# Patient Record
Sex: FEMALE | Race: WHITE | NOT HISPANIC OR LATINO | Employment: OTHER | URBAN - METROPOLITAN AREA
[De-identification: names, ages, dates, MRNs, and addresses within clinical notes are randomized per-mention and may not be internally consistent; named-entity substitution may affect disease eponyms.]

---

## 2020-02-03 ENCOUNTER — OFFICE VISIT (OUTPATIENT)
Dept: FAMILY MEDICINE CLINIC | Facility: CLINIC | Age: 85
End: 2020-02-03
Payer: MEDICARE

## 2020-02-03 VITALS
SYSTOLIC BLOOD PRESSURE: 134 MMHG | HEIGHT: 59 IN | BODY MASS INDEX: 28.22 KG/M2 | DIASTOLIC BLOOD PRESSURE: 64 MMHG | TEMPERATURE: 99.7 F | WEIGHT: 140 LBS | RESPIRATION RATE: 16 BRPM | OXYGEN SATURATION: 96 % | HEART RATE: 113 BPM

## 2020-02-03 DIAGNOSIS — E78.5 DYSLIPIDEMIA: ICD-10-CM

## 2020-02-03 DIAGNOSIS — R09.81 CONGESTION OF NASAL SINUS: ICD-10-CM

## 2020-02-03 DIAGNOSIS — M67.40 GANGLION CYST: ICD-10-CM

## 2020-02-03 DIAGNOSIS — R53.83 FATIGUE, UNSPECIFIED TYPE: ICD-10-CM

## 2020-02-03 DIAGNOSIS — M25.531 RIGHT WRIST PAIN: Primary | ICD-10-CM

## 2020-02-03 PROBLEM — I25.10 CORONARY ARTERY DISEASE: Status: ACTIVE | Noted: 2020-02-03

## 2020-02-03 PROBLEM — J45.20 MILD INTERMITTENT ASTHMA WITHOUT COMPLICATION: Status: ACTIVE | Noted: 2020-02-03

## 2020-02-03 PROCEDURE — 3008F BODY MASS INDEX DOCD: CPT | Performed by: INTERNAL MEDICINE

## 2020-02-03 PROCEDURE — 1160F RVW MEDS BY RX/DR IN RCRD: CPT | Performed by: INTERNAL MEDICINE

## 2020-02-03 PROCEDURE — 1036F TOBACCO NON-USER: CPT | Performed by: INTERNAL MEDICINE

## 2020-02-03 PROCEDURE — 4040F PNEUMOC VAC/ADMIN/RCVD: CPT | Performed by: INTERNAL MEDICINE

## 2020-02-03 PROCEDURE — 99203 OFFICE O/P NEW LOW 30 MIN: CPT | Performed by: INTERNAL MEDICINE

## 2020-02-03 RX ORDER — CHOLECALCIFEROL (VITAMIN D3) 125 MCG
2500 CAPSULE ORAL DAILY
COMMUNITY

## 2020-02-03 RX ORDER — CHOLECALCIFEROL (VITAMIN D3) 125 MCG
500 CAPSULE ORAL DAILY
COMMUNITY

## 2020-02-03 RX ORDER — CETIRIZINE HYDROCHLORIDE 10 MG/1
10 TABLET, CHEWABLE ORAL DAILY
Start: 2020-02-03

## 2020-02-03 RX ORDER — MELOXICAM 15 MG/1
7.5 TABLET ORAL DAILY
Qty: 30 TABLET | Refills: 1 | Status: SHIPPED | OUTPATIENT
Start: 2020-02-03 | End: 2020-07-30

## 2020-02-03 RX ORDER — MECLIZINE HCL 12.5 MG/1
12.5 TABLET ORAL DAILY PRN
COMMUNITY
Start: 2020-01-22

## 2020-02-03 RX ORDER — DIAPER,BRIEF,ADULT, DISPOSABLE
EACH MISCELLANEOUS
COMMUNITY

## 2020-02-03 NOTE — PROGRESS NOTES
Assessment/Plan:         Diagnoses and all orders for this visit:    Right wrist pain  Comments:  mobic with gi protection  back to dr Yakov Morse  Orders:  -     meloxicam (MOBIC) 15 mg tablet; Take 0 5 tablets (7 5 mg total) by mouth daily Take omeprazole 20mg with med  -     Ambulatory referral to Plastic Surgery; Future    Congestion of nasal sinus  -     cetirizine (ZyrTEC) 10 MG chewable tablet; Chew 1 tablet (10 mg total) daily    Ganglion cyst  Comments:  consult dr Lang Freedman  Orders:  -     Ambulatory referral to Plastic Surgery; Future    Dyslipidemia  Comments:  due for lab  Orders:  -     Comprehensive metabolic panel; Future  -     Lipid panel; Future    Fatigue, unspecified type  Comments:  due for lab  Orders:  -     CBC; Future  -     TSH, 3rd generation with Free T4 reflex; Future    Other orders  -     VITAMIN E COMPLEX PO; Take by mouth  -     Lecithin 1200 MG CAPS; Take by mouth  -     CALCIUM-MAGNESIUM-VITAMIN D ER PO; Take by mouth  -     co-enzyme Q-10 30 MG capsule; Take 30 mg by mouth 3 (three) times a day  -     Iodine, Kelp, (KELP PO); Take 225 mcg by mouth  -     vitamin B-12 (VITAMIN B-12) 500 mcg tablet; Take 500 mcg by mouth daily  -     Cholecalciferol (VITAMIN D3) 50 MCG (2000 UT) TABS; Take 2,500 Units by mouth daily  -     meclizine (ANTIVERT) 12 5 MG tablet; Take 12 5 mg by mouth daily as needed          Subjective:      Patient ID: Annette Atwood is a 80 y o  female  Pt complains of pain in her wrist   It is over ganglionic cyst(3)  She had cts by dr Lang Freedman  Discussed to return to hand specialist   +pain with palp of cysts  Unable to relieve the pain      The following portions of the patient's history were reviewed and updated as appropriate: She  has a past medical history of GERD (gastroesophageal reflux disease), Hiatal hernia, and Post-nasal drip    She   Patient Active Problem List    Diagnosis Date Noted    Coronary artery disease 02/03/2020    Mild intermittent asthma without complication 12/12/3058     She  has a past surgical history that includes Carpal tunnel release and Coronary artery bypass graft  Her family history includes Coronary artery disease in her father  She  reports that she has quit smoking  She has never used smokeless tobacco  She reports that she drinks alcohol  She reports that she does not use drugs  Current Outpatient Medications   Medication Sig Dispense Refill    aspirin (ECOTRIN LOW STRENGTH) 81 mg EC tablet Take 81 mg by mouth daily      atorvastatin (LIPITOR) 40 mg tablet Take 40 mg by mouth daily      brimonidine (ALPHAGAN P) 0 15 % ophthalmic solution 1 drop 3 (three) times a day      CALCIUM-MAGNESIUM-VITAMIN D ER PO Take by mouth      chlordiazePOXIDE (LIBRIUM) 5 mg capsule Take 5 mg by mouth as needed for anxiety      Cholecalciferol (VITAMIN D3) 50 MCG (2000 UT) TABS Take 2,500 Units by mouth daily      co-enzyme Q-10 30 MG capsule Take 30 mg by mouth 3 (three) times a day      Iodine, Kelp, (KELP PO) Take 225 mcg by mouth      Lecithin 1200 MG CAPS Take by mouth      multivitamin (THERAGRAN) TABS Take 1 tablet by mouth daily      vitamin B-12 (VITAMIN B-12) 500 mcg tablet Take 500 mcg by mouth daily      VITAMIN E COMPLEX PO Take by mouth      cetirizine (ZyrTEC) 10 MG chewable tablet Chew 1 tablet (10 mg total) daily      meclizine (ANTIVERT) 12 5 MG tablet Take 12 5 mg by mouth daily as needed      meloxicam (MOBIC) 15 mg tablet Take 0 5 tablets (7 5 mg total) by mouth daily Take omeprazole 20mg with med 30 tablet 1     No current facility-administered medications for this visit        Current Outpatient Medications on File Prior to Visit   Medication Sig    aspirin (ECOTRIN LOW STRENGTH) 81 mg EC tablet Take 81 mg by mouth daily    atorvastatin (LIPITOR) 40 mg tablet Take 40 mg by mouth daily    brimonidine (ALPHAGAN P) 0 15 % ophthalmic solution 1 drop 3 (three) times a day    CALCIUM-MAGNESIUM-VITAMIN D ER PO Take by mouth    chlordiazePOXIDE (LIBRIUM) 5 mg capsule Take 5 mg by mouth as needed for anxiety    Cholecalciferol (VITAMIN D3) 50 MCG (2000 UT) TABS Take 2,500 Units by mouth daily    co-enzyme Q-10 30 MG capsule Take 30 mg by mouth 3 (three) times a day    Iodine, Kelp, (KELP PO) Take 225 mcg by mouth    Lecithin 1200 MG CAPS Take by mouth    multivitamin (THERAGRAN) TABS Take 1 tablet by mouth daily    vitamin B-12 (VITAMIN B-12) 500 mcg tablet Take 500 mcg by mouth daily    VITAMIN E COMPLEX PO Take by mouth    meclizine (ANTIVERT) 12 5 MG tablet Take 12 5 mg by mouth daily as needed     No current facility-administered medications on file prior to visit  She is allergic to adhesive [medical tape]; banana; ciprofloxacin; flu virus vaccine; milk-related compounds; sulfa antibiotics; tetanus toxoids; zetia [ezetimibe]; and zocor [simvastatin]       Review of Systems   Constitutional: Negative  HENT: Negative  Respiratory: Negative  Cardiovascular: Negative  Musculoskeletal: Positive for arthralgias  Wrist pain  Especially at her multiple ganglionic cyst   Skin: Negative for color change, rash and wound  Objective:      /64 (BP Location: Left arm, Patient Position: Sitting, Cuff Size: Large)   Pulse (!) 113   Temp 99 7 °F (37 6 °C) (Temporal)   Resp 16   Ht 4' 10 5" (1 486 m)   Wt 63 5 kg (140 lb)   SpO2 96%   BMI 28 76 kg/m²          Physical Exam   Constitutional: She appears well-developed and well-nourished  No distress  HENT:   Head: Normocephalic and atraumatic  Right Ear: External ear normal    Left Ear: External ear normal    Nose: Nose normal    Mouth/Throat: Oropharynx is clear and moist  No oropharyngeal exudate  Neck: Normal range of motion  Neck supple  No tracheal deviation present  No thyromegaly present  Cardiovascular: Normal rate, regular rhythm, normal heart sounds and intact distal pulses  Exam reveals no gallop and no friction rub     No murmur heard  Pulmonary/Chest: Effort normal and breath sounds normal  No stridor  No respiratory distress  She has no wheezes  She has no rales  Musculoskeletal: She exhibits tenderness  She exhibits no edema  3 ganglion cysts present in rt wrist   Lymphadenopathy:     She has no cervical adenopathy  Skin: She is not diaphoretic  BMI Counseling: Body mass index is 28 76 kg/m²  The BMI is above normal  Nutrition recommendations include reducing portion sizes and decreasing overall calorie intake  Exercise recommendations include exercising 3-5 times per week

## 2020-04-30 ENCOUNTER — TRANSCRIBE ORDERS (OUTPATIENT)
Dept: ADMINISTRATIVE | Facility: HOSPITAL | Age: 85
End: 2020-04-30

## 2020-04-30 DIAGNOSIS — M79.662 PAIN AND SWELLING OF LEFT LOWER LEG: Primary | ICD-10-CM

## 2020-04-30 DIAGNOSIS — M79.89 PAIN AND SWELLING OF LEFT LOWER LEG: Primary | ICD-10-CM

## 2020-07-30 DIAGNOSIS — M25.531 RIGHT WRIST PAIN: ICD-10-CM

## 2020-07-30 RX ORDER — MELOXICAM 15 MG/1
7.5 TABLET ORAL DAILY
Qty: 30 TABLET | Refills: 1 | Status: SHIPPED | OUTPATIENT
Start: 2020-07-30 | End: 2020-12-21

## 2020-11-02 ENCOUNTER — TELEPHONE (OUTPATIENT)
Dept: FAMILY MEDICINE CLINIC | Facility: CLINIC | Age: 85
End: 2020-11-02

## 2020-11-05 ENCOUNTER — TRANSITIONAL CARE MANAGEMENT (OUTPATIENT)
Dept: FAMILY MEDICINE CLINIC | Facility: CLINIC | Age: 85
End: 2020-11-05

## 2020-12-19 DIAGNOSIS — M25.531 RIGHT WRIST PAIN: ICD-10-CM

## 2020-12-21 RX ORDER — MELOXICAM 15 MG/1
7.5 TABLET ORAL DAILY
Qty: 10 TABLET | Refills: 0 | Status: SHIPPED | OUTPATIENT
Start: 2020-12-21

## 2021-02-12 DIAGNOSIS — Z23 ENCOUNTER FOR IMMUNIZATION: ICD-10-CM
